# Patient Record
Sex: MALE | Race: WHITE | NOT HISPANIC OR LATINO | URBAN - METROPOLITAN AREA
[De-identification: names, ages, dates, MRNs, and addresses within clinical notes are randomized per-mention and may not be internally consistent; named-entity substitution may affect disease eponyms.]

---

## 2020-03-05 ENCOUNTER — HISTORICAL (OUTPATIENT)
Dept: LAB | Facility: HOSPITAL | Age: 55
End: 2020-03-05

## 2020-03-05 LAB
ABS NEUT (OLG): 4.93 X10(3)/MCL (ref 2.1–9.2)
ANTINUCLEAR ANTIBODY SCREEN (OHS): NEGATIVE
BASOPHILS # BLD AUTO: 0 X10(3)/MCL (ref 0–0.2)
BASOPHILS NFR BLD AUTO: 1 %
BUN SERPL-MCNC: 8 MG/DL (ref 7–18)
CALCIUM SERPL-MCNC: 9 MG/DL (ref 8.5–10.1)
CHLORIDE SERPL-SCNC: 108 MMOL/L (ref 98–107)
CO2 SERPL-SCNC: 29 MMOL/L (ref 21–32)
CREAT SERPL-MCNC: 0.77 MG/DL (ref 0.7–1.3)
CREAT/UREA NIT SERPL: 10.4
DEPRECATED CALCIDIOL+CALCIFEROL SERPL-MC: 5.32 NG/ML (ref 30–80)
DSDNA ANTIBODY (OHS): NEGATIVE
EOSINOPHIL # BLD AUTO: 0.2 X10(3)/MCL (ref 0–0.9)
EOSINOPHIL NFR BLD AUTO: 2 %
ERYTHROCYTE [DISTWIDTH] IN BLOOD BY AUTOMATED COUNT: 13.2 % (ref 11.5–17)
GLUCOSE SERPL-MCNC: 90 MG/DL (ref 74–106)
HCT VFR BLD AUTO: 49.3 % (ref 42–52)
HGB BLD-MCNC: 15.9 GM/DL (ref 14–18)
LYMPHOCYTES # BLD AUTO: 2.7 X10(3)/MCL (ref 0.6–4.6)
LYMPHOCYTES NFR BLD AUTO: 32 %
MCH RBC QN AUTO: 31.1 PG (ref 27–31)
MCHC RBC AUTO-ENTMCNC: 32.3 GM/DL (ref 33–36)
MCV RBC AUTO: 96.3 FL (ref 80–94)
MONOCYTES # BLD AUTO: 0.6 X10(3)/MCL (ref 0.1–1.3)
MONOCYTES NFR BLD AUTO: 7 %
NEUTROPHILS # BLD AUTO: 4.93 X10(3)/MCL (ref 2.1–9.2)
NEUTROPHILS NFR BLD AUTO: 59 %
PLATELET # BLD AUTO: 289 X10(3)/MCL (ref 130–400)
PMV BLD AUTO: 9.4 FL (ref 9.4–12.4)
POTASSIUM SERPL-SCNC: 4.7 MMOL/L (ref 3.5–5.1)
PSA SERPL-MCNC: 1.98 NG/ML (ref 0–4)
RBC # BLD AUTO: 5.12 X10(6)/MCL (ref 4.7–6.1)
SODIUM SERPL-SCNC: 140 MMOL/L (ref 136–145)
TSH SERPL-ACNC: 1.85 MIU/L (ref 0.36–3.74)
VIT B12 SERPL-MCNC: 151 PG/ML (ref 193–986)
WBC # SPEC AUTO: 8.4 X10(3)/MCL (ref 4.5–11.5)

## 2020-03-17 ENCOUNTER — HISTORICAL (OUTPATIENT)
Dept: RESPIRATORY THERAPY | Facility: HOSPITAL | Age: 55
End: 2020-03-17

## 2020-10-07 ENCOUNTER — HISTORICAL (OUTPATIENT)
Dept: ADMINISTRATIVE | Facility: HOSPITAL | Age: 55
End: 2020-10-07

## 2020-10-07 LAB
ABS NEUT (OLG): 4.1 X10(3)/MCL (ref 2.1–9.2)
ALBUMIN SERPL-MCNC: 4.3 GM/DL (ref 3.5–5)
ALBUMIN/GLOB SERPL: 2 RATIO (ref 1.1–2)
ALP SERPL-CCNC: 84 UNIT/L (ref 40–150)
ALT SERPL-CCNC: 11 UNIT/L (ref 0–55)
AST SERPL-CCNC: 15 UNIT/L (ref 5–34)
BASOPHILS # BLD AUTO: 0 X10(3)/MCL (ref 0–0.2)
BASOPHILS NFR BLD AUTO: 0 %
BILIRUB SERPL-MCNC: 1.2 MG/DL
BILIRUBIN DIRECT+TOT PNL SERPL-MCNC: 0.4 MG/DL (ref 0–0.5)
BILIRUBIN DIRECT+TOT PNL SERPL-MCNC: 0.8 MG/DL (ref 0–0.8)
BUN SERPL-MCNC: 9 MG/DL (ref 8.4–25.7)
CALCIUM SERPL-MCNC: 9 MG/DL (ref 8.4–10.2)
CHLORIDE SERPL-SCNC: 106 MMOL/L (ref 98–107)
CHOLEST SERPL-MCNC: 167 MG/DL
CHOLEST/HDLC SERPL: 4 {RATIO} (ref 0–5)
CO2 SERPL-SCNC: 28 MMOL/L (ref 22–29)
CREAT SERPL-MCNC: 0.77 MG/DL (ref 0.73–1.18)
DEPRECATED CALCIDIOL+CALCIFEROL SERPL-MC: 23.5 NG/ML (ref 6.6–49.9)
EOSINOPHIL # BLD AUTO: 0.2 X10(3)/MCL (ref 0–0.9)
EOSINOPHIL NFR BLD AUTO: 3 %
ERYTHROCYTE [DISTWIDTH] IN BLOOD BY AUTOMATED COUNT: 13.2 % (ref 11.5–17)
GLOBULIN SER-MCNC: 2.1 GM/DL (ref 2.4–3.5)
GLUCOSE SERPL-MCNC: 83 MG/DL (ref 74–100)
HCT VFR BLD AUTO: 45 % (ref 42–52)
HDLC SERPL-MCNC: 46 MG/DL (ref 35–60)
HGB BLD-MCNC: 14.8 GM/DL (ref 14–18)
LDLC SERPL CALC-MCNC: 107 MG/DL (ref 50–140)
LYMPHOCYTES # BLD AUTO: 3.1 X10(3)/MCL (ref 0.6–4.6)
LYMPHOCYTES NFR BLD AUTO: 39 %
MCH RBC QN AUTO: 31.2 PG (ref 27–31)
MCHC RBC AUTO-ENTMCNC: 32.9 GM/DL (ref 33–36)
MCV RBC AUTO: 94.7 FL (ref 80–94)
MONOCYTES # BLD AUTO: 0.6 X10(3)/MCL (ref 0.1–1.3)
MONOCYTES NFR BLD AUTO: 7 %
NEUTROPHILS # BLD AUTO: 4.1 X10(3)/MCL (ref 2.1–9.2)
NEUTROPHILS NFR BLD AUTO: 51 %
PLATELET # BLD AUTO: 261 X10(3)/MCL (ref 130–400)
PMV BLD AUTO: 8.7 FL (ref 9.4–12.4)
POTASSIUM SERPL-SCNC: 4.7 MMOL/L (ref 3.5–5.1)
PROT SERPL-MCNC: 6.4 GM/DL (ref 6.4–8.3)
PSA SERPL-MCNC: 1.74 NG/ML
RBC # BLD AUTO: 4.75 X10(6)/MCL (ref 4.7–6.1)
SODIUM SERPL-SCNC: 140 MMOL/L (ref 136–145)
TRIGL SERPL-MCNC: 69 MG/DL (ref 34–140)
TSH SERPL-ACNC: 1.37 UIU/ML (ref 0.35–4.94)
VIT B12 SERPL-MCNC: 513 PG/ML (ref 213–816)
VLDLC SERPL CALC-MCNC: 14 MG/DL
WBC # SPEC AUTO: 8.1 X10(3)/MCL (ref 4.5–11.5)

## 2021-07-20 ENCOUNTER — HISTORICAL (OUTPATIENT)
Dept: ADMINISTRATIVE | Facility: HOSPITAL | Age: 56
End: 2021-07-20

## 2021-07-20 LAB
ABS NEUT (OLG): 3.83 X10(3)/MCL (ref 2.1–9.2)
ALBUMIN SERPL-MCNC: 4 GM/DL (ref 3.5–5)
ALBUMIN/GLOB SERPL: 2 RATIO (ref 1.1–2)
ALP SERPL-CCNC: 86 UNIT/L (ref 40–150)
ALT SERPL-CCNC: 16 UNIT/L (ref 0–55)
AST SERPL-CCNC: 17 UNIT/L (ref 5–34)
BASOPHILS # BLD AUTO: 0 X10(3)/MCL (ref 0–0.2)
BASOPHILS NFR BLD AUTO: 1 %
BILIRUB SERPL-MCNC: 1 MG/DL
BILIRUBIN DIRECT+TOT PNL SERPL-MCNC: 0.4 MG/DL (ref 0–0.5)
BILIRUBIN DIRECT+TOT PNL SERPL-MCNC: 0.6 MG/DL (ref 0–0.8)
BUN SERPL-MCNC: 8.5 MG/DL (ref 8.4–25.7)
CALCIUM SERPL-MCNC: 9.4 MG/DL (ref 8.4–10.2)
CHLORIDE SERPL-SCNC: 108 MMOL/L (ref 98–107)
CHOLEST SERPL-MCNC: 139 MG/DL
CHOLEST/HDLC SERPL: 4 {RATIO} (ref 0–5)
CO2 SERPL-SCNC: 26 MMOL/L (ref 22–29)
CREAT SERPL-MCNC: 0.78 MG/DL (ref 0.73–1.18)
DEPRECATED CALCIDIOL+CALCIFEROL SERPL-MC: 25.7 NG/ML (ref 30–80)
EOSINOPHIL # BLD AUTO: 0.2 X10(3)/MCL (ref 0–0.9)
EOSINOPHIL NFR BLD AUTO: 2 %
ERYTHROCYTE [DISTWIDTH] IN BLOOD BY AUTOMATED COUNT: 13.4 % (ref 11.5–17)
GLOBULIN SER-MCNC: 2 GM/DL (ref 2.4–3.5)
GLUCOSE SERPL-MCNC: 87 MG/DL (ref 74–100)
HCT VFR BLD AUTO: 44.5 % (ref 42–52)
HDLC SERPL-MCNC: 36 MG/DL (ref 35–60)
HGB BLD-MCNC: 14.6 GM/DL (ref 14–18)
LDLC SERPL CALC-MCNC: 91 MG/DL (ref 50–140)
LYMPHOCYTES # BLD AUTO: 3 X10(3)/MCL (ref 0.6–4.6)
LYMPHOCYTES NFR BLD AUTO: 39 %
MCH RBC QN AUTO: 30.8 PG (ref 27–31)
MCHC RBC AUTO-ENTMCNC: 32.8 GM/DL (ref 33–36)
MCV RBC AUTO: 93.9 FL (ref 80–94)
MONOCYTES # BLD AUTO: 0.6 X10(3)/MCL (ref 0.1–1.3)
MONOCYTES NFR BLD AUTO: 8 %
NEUTROPHILS # BLD AUTO: 3.83 X10(3)/MCL (ref 2.1–9.2)
NEUTROPHILS NFR BLD AUTO: 50 %
PLATELET # BLD AUTO: 297 X10(3)/MCL (ref 130–400)
PMV BLD AUTO: 8.9 FL (ref 9.4–12.4)
POTASSIUM SERPL-SCNC: 5.4 MMOL/L (ref 3.5–5.1)
PROT SERPL-MCNC: 6 GM/DL (ref 6.4–8.3)
PSA SERPL-MCNC: 1.49 NG/ML
RBC # BLD AUTO: 4.74 X10(6)/MCL (ref 4.7–6.1)
SODIUM SERPL-SCNC: 144 MMOL/L (ref 136–145)
TRIGL SERPL-MCNC: 58 MG/DL (ref 34–140)
TSH SERPL-ACNC: 1.41 UIU/ML (ref 0.35–4.94)
VIT B12 SERPL-MCNC: 511 PG/ML (ref 213–816)
VLDLC SERPL CALC-MCNC: 12 MG/DL
WBC # SPEC AUTO: 7.7 X10(3)/MCL (ref 4.5–11.5)

## 2021-08-04 ENCOUNTER — HISTORICAL (OUTPATIENT)
Dept: RADIOLOGY | Facility: HOSPITAL | Age: 56
End: 2021-08-04

## 2023-06-06 DIAGNOSIS — J30.9 ALLERGIC RHINITIS, UNSPECIFIED: ICD-10-CM

## 2023-06-06 RX ORDER — FLUTICASONE PROPIONATE 50 MCG
SPRAY, SUSPENSION (ML) NASAL
Qty: 16 G | Refills: 3 | Status: SHIPPED | OUTPATIENT
Start: 2023-06-06 | End: 2024-03-21 | Stop reason: SDUPTHER

## 2023-11-14 ENCOUNTER — OFFICE VISIT (OUTPATIENT)
Dept: URGENT CARE | Facility: CLINIC | Age: 58
End: 2023-11-14
Payer: COMMERCIAL

## 2023-11-14 VITALS
HEIGHT: 68 IN | OXYGEN SATURATION: 98 % | DIASTOLIC BLOOD PRESSURE: 97 MMHG | HEART RATE: 87 BPM | SYSTOLIC BLOOD PRESSURE: 169 MMHG | BODY MASS INDEX: 21.52 KG/M2 | TEMPERATURE: 98 F | RESPIRATION RATE: 18 BRPM | WEIGHT: 142 LBS

## 2023-11-14 DIAGNOSIS — N50.819 PAIN IN TESTICLE, UNSPECIFIED LATERALITY: Primary | ICD-10-CM

## 2023-11-14 PROCEDURE — 99213 OFFICE O/P EST LOW 20 MIN: CPT | Mod: ,,, | Performed by: FAMILY MEDICINE

## 2023-11-14 PROCEDURE — 99213 PR OFFICE/OUTPT VISIT, EST, LEVL III, 20-29 MIN: ICD-10-PCS | Mod: ,,, | Performed by: FAMILY MEDICINE

## 2023-11-14 NOTE — PROGRESS NOTES
"Subjective:      Patient ID: Ahsan Loredo Jr is a 58 y.o. male.    Vitals:  height is 5' 8" (1.727 m) and weight is 64.4 kg (142 lb). His temperature is 98.3 °F (36.8 °C). His blood pressure is 169/97 (abnormal) and his pulse is 87. His respiration is 18 and oxygen saturation is 98%.     Chief Complaint: Groin Pain (Groin pain left testicle x3w painful to touch /Denies any trama/Request Dr. Abel)    58-year-old male presents to clinic complaining of a 3 to four-week history of testicular pain and swelling.  States it began on the right side with pain and swelling.  Patient states the swelling resolved but the pain in the testicle never resolved.  Now he is having pain in the left testicle.  Worse when he sits down.  No issues with voids.        Constitution: Negative.   HENT: Negative.     Neck: neck negative.   Cardiovascular: Negative.    Eyes: Negative.    Respiratory: Negative.     Gastrointestinal: Negative.    Genitourinary:  Positive for testicular pain.   Musculoskeletal: Negative.    Skin: Negative.    Allergic/Immunologic: Negative.    Neurological: Negative.    Hematologic/Lymphatic: Negative.       Objective:     Physical Exam   Constitutional:  Non-toxic appearance. He does not appear ill. No distress.   HENT:   Head: Normocephalic and atraumatic.   Abdominal: Normal appearance.   Genitourinary: Right testis shows tenderness. Right testis shows no swelling. Left testis shows tenderness. Left testis shows no swelling.   Neurological: He is alert.   Skin: Skin is not diaphoretic.   Vitals reviewed.         Previous History      Review of patient's allergies indicates:  No Known Allergies    Past Medical History:   Diagnosis Date    Asthma     COPD (chronic obstructive pulmonary disease)     Crohn's disease      Current Outpatient Medications   Medication Instructions    albuterol (PROVENTIL/VENTOLIN HFA) 90 mcg/actuation inhaler inhale 1 PUFF BY MOUTH FOUR TIMES DAILY AS NEEDED FOR wheezing    ALLERGY " "RELIEF, FEXOFENADINE, 180 mg tablet TAKE ONE TABLET BY MOUTH DAILY    fluticasone propionate (FLONASE) 50 mcg/actuation nasal spray inhale 2 SPRAYS IN EACH NOSTRIL TWICE DAILY    montelukast (SINGULAIR) 10 mg tablet TAKE ONE TABLET BY MOUTH DAILY     Past Surgical History:   Procedure Laterality Date    HEMORRHOID SURGERY       Family History   Problem Relation Age of Onset    Thyroid cancer Mother     Cancer Father        Social History     Tobacco Use    Smoking status: Every Day     Current packs/day: 0.50     Types: Cigarettes    Smokeless tobacco: Never   Substance Use Topics    Alcohol use: Never        Physical Exam      Vital Signs Reviewed   BP (!) 169/97   Pulse 87   Temp 98.3 °F (36.8 °C)   Resp 18   Ht 5' 8" (1.727 m)   Wt 64.4 kg (142 lb)   SpO2 98%   BMI 21.59 kg/m²        Procedures    Procedures     Labs     Results for orders placed or performed in visit on 07/20/21   Comprehensive Metabolic Panel   Result Value Ref Range    Alanine Aminotransferase 16 0 - 55 unit/L    Albumin/Globulin Ratio 2.0 1.1 - 2.0 ratio    Alkaline Phosphatase 86 40 - 150 unit/L    Aspartate Aminotransferase 17 5 - 34 unit/L    Albumin Level 4.0 3.5 - 5.0 gm/dL    Blood Urea Nitrogen 8.5 8.4 - 25.7 mg/dL    Bilirubin Total 1.0 <<=1.5 mg/dL    Bilirubin Indirect 0.60 0.00 - 0.80 mg/dL    Bilirubin Direct 0.4 0.0 - 0.5 mg/dL    Carbon Dioxide 26 22 - 29 mmol/L    Calcium Level Total 9.4 8.4 - 10.2 mg/dL    Chloride 108 (H) 98 - 107 mmol/L    Creatinine 0.78 0.73 - 1.18 mg/dL    Glucose Level 87 74 - 100 mg/dL    Globulin 2.0 (L) 2.4 - 3.5 gm/dL    Sodium Level 144 136 - 145 mmol/L    Potassium Level 5.4 (H) 3.5 - 5.1 mmol/L    Protein Total 6.0 (L) 6.4 - 8.3 gm/dL   Lipid Panel   Result Value Ref Range    Cholesterol/HDL Ratio 4 0 - 5    Cholesterol Total 139 <<=200 mg/dL    HDL Cholesterol 36 35 - 60 mg/dL    LDL Cholesterol 91.00 50.00 - 140.00 mg/dL    Triglyceride 58 34 - 140 mg/dL    Very Low Density Lipoprotein 12 "    PSA, Screening   Result Value Ref Range    Prostate Specific Antigen 1.49 <<=4.00 ng/mL   TSH   Result Value Ref Range    TSH 1.4058 0.3500 - 4.9400 uIU/mL   Vitamin B12   Result Value Ref Range    Vitamin B12 Level 511 213 - 816 pg/mL   Vitamin D   Result Value Ref Range    Vit D 25 OH 25.7 (L) 30.0 - 80.0 ng/mL   GFR, Estimated   Result Value Ref Range    Estimated GFR-Non African American >60 mL/min/1.73 m2    Estimated GFR- >60    Differential   Result Value Ref Range    Baso # 0.0 0.0 - 0.2 x10(3)/mcL    Neut # 3.83 2.10 - 9.20 x10(3)/mcL    Mono # 0.6 0.1 - 1.3 x10(3)/mcL    Basophil % 1 %    Eos # 0.2 0.0 - 0.9 x10(3)/mcL    Lymph # 3.0 0.6 - 4.6 x10(3)/mcL    Mono % 8 %    Lymph % 39 %    Eos % 2 %    Neut % 50 %   CBC Auto Differential   Result Value Ref Range    Neutrophils Abs 3.83 2.10 - 9.20 x10(3)/mcL    Hgb 14.6 14.0 - 18.0 gm/dL    MCH 30.8 27.0 - 31.0 pg    Hct 44.5 42.0 - 52.0 %    MCHC 32.8 (L) 33.0 - 36.0 gm/dL    MCV 93.9 80.0 - 94.0 fL    MPV 8.9 (L) 9.4 - 12.4 fL    RDW 13.4 11.5 - 17.0 %    WBC 7.7 4.5 - 11.5 x10(3)/mcL    Platelet 297 130 - 400 x10(3)/mcL    RBC 4.74 4.70 - 6.10 x10(6)/mcL       Assessment:     1. Pain in testicle, unspecified laterality        Plan:   As we discussed, it is recommended that you present to the ER now for further evaluation to prevent a delay in care.     Been referred to Dr. Bloom for your PCP needs.    Pain in testicle, unspecified laterality  -     Ambulatory referral/consult to Family Practice

## 2023-12-20 ENCOUNTER — OFFICE VISIT (OUTPATIENT)
Dept: PRIMARY CARE CLINIC | Facility: CLINIC | Age: 58
End: 2023-12-20
Payer: COMMERCIAL

## 2023-12-20 VITALS
DIASTOLIC BLOOD PRESSURE: 88 MMHG | RESPIRATION RATE: 18 BRPM | BODY MASS INDEX: 21.98 KG/M2 | OXYGEN SATURATION: 99 % | SYSTOLIC BLOOD PRESSURE: 137 MMHG | HEIGHT: 68 IN | WEIGHT: 145 LBS | HEART RATE: 90 BPM

## 2023-12-20 DIAGNOSIS — Z82.49 FAMILY HISTORY OF EARLY CAD: Chronic | ICD-10-CM

## 2023-12-20 DIAGNOSIS — Z76.89 ESTABLISHING CARE WITH NEW DOCTOR, ENCOUNTER FOR: Primary | ICD-10-CM

## 2023-12-20 DIAGNOSIS — Z00.00 WELLNESS EXAMINATION: ICD-10-CM

## 2023-12-20 DIAGNOSIS — F17.210 SMOKING GREATER THAN 40 PACK YEARS: Chronic | ICD-10-CM

## 2023-12-20 DIAGNOSIS — K50.10 CROHN'S DISEASE OF LARGE INTESTINE WITHOUT COMPLICATION: Chronic | ICD-10-CM

## 2023-12-20 DIAGNOSIS — Z12.5 SCREENING FOR PROSTATE CANCER: ICD-10-CM

## 2023-12-20 DIAGNOSIS — J43.2 CENTRILOBULAR EMPHYSEMA: Chronic | ICD-10-CM

## 2023-12-20 PROCEDURE — 99213 PR OFFICE/OUTPT VISIT, EST, LEVL III, 20-29 MIN: ICD-10-PCS | Mod: ,,, | Performed by: GENERAL PRACTICE

## 2023-12-20 PROCEDURE — 99213 OFFICE O/P EST LOW 20 MIN: CPT | Mod: ,,, | Performed by: GENERAL PRACTICE

## 2023-12-20 NOTE — ASSESSMENT & PLAN NOTE
Smoking cessation strongly encouraged, we will evaluate over time and possibly modify his medical regimen.

## 2023-12-20 NOTE — PROGRESS NOTES
"Subjective:       Patient ID: Ahsan Loredo Jr is a 58 y.o. male.    Chief Complaint: Establish Care    Patient presents to the clinic to establish primary care.  Past medical, family, and social history is reviewed, as well as all chronic conditions, and medications prescribed to treat those conditions.  -history of COPD, saw a pulmonologist previously, had pulmonary function testing, prescribed an inhaler which she could not afford, currently using albuterol on occasion, some dyspnea on exertion, still smoking, over 50 pack year, quit for a while, now smoking about five cigarettes per day.  -history of a modified form of Crohn's disease, in remission, not on medication, does not actively see a gastroenterologist          Review of Systems   Constitutional: Negative.  Negative for fatigue and fever.   HENT: Negative.  Negative for sore throat and trouble swallowing.    Eyes: Negative.  Negative for redness and visual disturbance.   Respiratory: Negative.  Negative for chest tightness and shortness of breath.    Cardiovascular: Negative.  Negative for chest pain.   Gastrointestinal: Negative.  Negative for abdominal pain, blood in stool and nausea.   Endocrine: Negative.  Negative for cold intolerance, heat intolerance and polyuria.   Genitourinary: Negative.    Musculoskeletal: Negative.  Negative for arthralgias, gait problem and joint swelling.   Integumentary:  Negative for rash. Negative.   Neurological: Negative.  Negative for dizziness, weakness and headaches.   Psychiatric/Behavioral: Negative.  Negative for agitation and dysphoric mood.            Patient Care Team:  Duglas Bloom MD as PCP - General (Family Medicine)  Duglas Bloom MD as Consulting Physician (Family Medicine)  Objective:   Visit Vitals  /88   Pulse 90   Resp 18   Ht 5' 8" (1.727 m)   Wt 65.8 kg (145 lb)   SpO2 99%   BMI 22.05 kg/m²        Physical Exam  Constitutional:       Appearance: Normal appearance.   HENT:      Head: " "Normocephalic.      Mouth/Throat:      Mouth: Mucous membranes are moist.      Pharynx: Oropharynx is clear.   Eyes:      Conjunctiva/sclera: Conjunctivae normal.      Pupils: Pupils are equal, round, and reactive to light.   Cardiovascular:      Rate and Rhythm: Normal rate and regular rhythm.      Heart sounds: Normal heart sounds.   Pulmonary:      Effort: Pulmonary effort is normal.      Comments: Bilateral decrease in breath sounds consistent with emphysema  Abdominal:      General: Abdomen is flat.      Palpations: Abdomen is soft.   Musculoskeletal:         General: Normal range of motion.      Cervical back: Neck supple.   Skin:     General: Skin is warm and dry.   Neurological:      General: No focal deficit present.      Mental Status: He is alert and oriented to person, place, and time.   Psychiatric:         Mood and Affect: Mood normal.         Behavior: Behavior normal.         Thought Content: Thought content normal.         Judgment: Judgment normal.           Lab Results   Component Value Date     07/20/2021    K 5.4 (H) 07/20/2021    CO2 26 07/20/2021    BUN 8.5 07/20/2021    CREATININE 0.78 07/20/2021    CALCIUM 9.4 07/20/2021    ALBUMIN 4.0 07/20/2021    BILITOT 1.0 07/20/2021    ALKPHOS 86 07/20/2021    AST 17 07/20/2021    ALT 16 07/20/2021     Lab Results   Component Value Date    WBC 7.7 07/20/2021    RBC 4.74 07/20/2021    HGB 14.6 07/20/2021    HCT 44.5 07/20/2021    MCV 93.9 07/20/2021    RDW 13.4 07/20/2021     07/20/2021      Lab Results   Component Value Date    CHOL 139 07/20/2021    TRIG 58 07/20/2021    HDL 36 07/20/2021    LDL 91.00 07/20/2021    TOTALCHOLEST 4 07/20/2021     Lab Results   Component Value Date    TSH 1.4058 07/20/2021     No results found for: "HGBA1C", "ESTIMATEDAVG"     Lab Results   Component Value Date    PSA 1.49 07/20/2021         Assessment:       ICD-10-CM ICD-9-CM   1. Establishing care with new doctor, encounter for  Z76.89 V65.8   2. Crohn's " disease of large intestine without complication  K50.10 555.1   3. Centrilobular emphysema  J43.2 492.8   4. Smoking greater than 40 pack years  F17.210 305.1   5. Family history of early CAD  Z82.49 V17.3   6. Wellness examination  Z00.00 V70.0   7. Screening for prostate cancer  Z12.5 V76.44       Current Outpatient Medications   Medication Instructions    albuterol (PROVENTIL/VENTOLIN HFA) 90 mcg/actuation inhaler inhale 1 PUFF BY MOUTH FOUR TIMES DAILY AS NEEDED FOR wheezing    ALLERGY RELIEF, FEXOFENADINE, 180 mg tablet TAKE ONE TABLET BY MOUTH DAILY    fluticasone propionate (FLONASE) 50 mcg/actuation nasal spray inhale 2 SPRAYS IN EACH NOSTRIL TWICE DAILY    montelukast (SINGULAIR) 10 mg tablet TAKE ONE TABLET BY MOUTH DAILY     Plan:     Problem List Items Addressed This Visit          Pulmonary    Centrilobular emphysema (Chronic)    Overview     Prescribed albuterol inhaler, uses as needed.         Current Assessment & Plan     Smoking cessation strongly encouraged, we will evaluate over time and possibly modify his medical regimen.            Cardiac/Vascular    Family history of early CAD (Chronic)    Overview     We will continue to monitor all modifiable risk factors to reduce overall risk of cardiovascular disease.            GI    Crohn's disease of large intestine without complication (Chronic)    Overview     Medical condition is currently stable, continue current treatment plan.            Other    Smoking greater than 40 pack years (Chronic)    Overview     Cessation of tobacco use in any form was discussed and strongly encouraged.  Assistance offered, information will be provided.  If not ready to quit now, patient will contact this clinic in the future if I can be of any assistance related to the discontinuation of tobacco use/smoking.          Other Visit Diagnoses       Establishing care with new doctor, encounter for    -  Primary    Patient's medical care has been established in this  clinic.  All issues addressed, all questions answered,  with appropriate scheduling of follow-up care.    Wellness examination        This diagnosis does not apply to this visit, wellness exam with pre visit labs will be scheduled/ordered for future visit.    Relevant Orders    Comprehensive Metabolic Panel    CBC Auto Differential    Lipid Panel    Hemoglobin A1C    TSH    PSA, Screening    Hepatitis C Antibody    Screening for prostate cancer        This diagnosis does not apply to this visit, appropriate prostate cancer screening will be ordered for next visit/wellness exam.    Relevant Orders    PSA, Screening                    Follow up in about 3 months (around 3/26/2024) for Wellness.

## 2024-02-05 ENCOUNTER — TELEPHONE (OUTPATIENT)
Dept: PRIMARY CARE CLINIC | Facility: CLINIC | Age: 59
End: 2024-02-05
Payer: COMMERCIAL

## 2024-03-21 ENCOUNTER — CLINICAL SUPPORT (OUTPATIENT)
Dept: PRIMARY CARE CLINIC | Facility: CLINIC | Age: 59
End: 2024-03-21
Payer: COMMERCIAL

## 2024-03-21 DIAGNOSIS — Z00.00 WELLNESS EXAMINATION: ICD-10-CM

## 2024-03-21 DIAGNOSIS — Z12.5 SCREENING FOR PROSTATE CANCER: ICD-10-CM

## 2024-03-21 DIAGNOSIS — J44.9 CHRONIC OBSTRUCTIVE PULMONARY DISEASE, UNSPECIFIED: ICD-10-CM

## 2024-03-21 DIAGNOSIS — J30.9 ALLERGIC RHINITIS, UNSPECIFIED: ICD-10-CM

## 2024-03-21 LAB
ALBUMIN SERPL-MCNC: 4.3 G/DL (ref 3.5–5)
ALBUMIN/GLOB SERPL: 2 RATIO (ref 1.1–2)
ALP SERPL-CCNC: 95 UNIT/L (ref 40–150)
ALT SERPL-CCNC: 12 UNIT/L (ref 0–55)
AST SERPL-CCNC: 18 UNIT/L (ref 5–34)
BASOPHILS # BLD AUTO: 0.04 X10(3)/MCL
BASOPHILS NFR BLD AUTO: 0.6 %
BILIRUB SERPL-MCNC: 1.5 MG/DL
BUN SERPL-MCNC: 10 MG/DL (ref 8.4–25.7)
CALCIUM SERPL-MCNC: 9.7 MG/DL (ref 8.4–10.2)
CHLORIDE SERPL-SCNC: 106 MMOL/L (ref 98–107)
CHOLEST SERPL-MCNC: 180 MG/DL
CHOLEST/HDLC SERPL: 3 {RATIO} (ref 0–5)
CO2 SERPL-SCNC: 29 MMOL/L (ref 22–29)
CREAT SERPL-MCNC: 0.88 MG/DL (ref 0.73–1.18)
EOSINOPHIL # BLD AUTO: 0.16 X10(3)/MCL (ref 0–0.9)
EOSINOPHIL NFR BLD AUTO: 2.3 %
ERYTHROCYTE [DISTWIDTH] IN BLOOD BY AUTOMATED COUNT: 13.2 % (ref 11.5–17)
EST. AVERAGE GLUCOSE BLD GHB EST-MCNC: 102.5 MG/DL
GFR SERPLBLD CREATININE-BSD FMLA CKD-EPI: >60 MLS/MIN/1.73/M2
GLOBULIN SER-MCNC: 2.2 GM/DL (ref 2.4–3.5)
GLUCOSE SERPL-MCNC: 91 MG/DL (ref 74–100)
HBA1C MFR BLD: 5.2 %
HCT VFR BLD AUTO: 46.8 % (ref 42–52)
HCV AB SERPL QL IA: NONREACTIVE
HDLC SERPL-MCNC: 56 MG/DL (ref 35–60)
HGB BLD-MCNC: 15.1 G/DL (ref 14–18)
IMM GRANULOCYTES # BLD AUTO: 0.02 X10(3)/MCL (ref 0–0.04)
IMM GRANULOCYTES NFR BLD AUTO: 0.3 %
LDLC SERPL CALC-MCNC: 105 MG/DL (ref 50–140)
LYMPHOCYTES # BLD AUTO: 2.98 X10(3)/MCL (ref 0.6–4.6)
LYMPHOCYTES NFR BLD AUTO: 43.2 %
MCH RBC QN AUTO: 30.4 PG (ref 27–31)
MCHC RBC AUTO-ENTMCNC: 32.3 G/DL (ref 33–36)
MCV RBC AUTO: 94.4 FL (ref 80–94)
MONOCYTES # BLD AUTO: 0.61 X10(3)/MCL (ref 0.1–1.3)
MONOCYTES NFR BLD AUTO: 8.8 %
NEUTROPHILS # BLD AUTO: 3.09 X10(3)/MCL (ref 2.1–9.2)
NEUTROPHILS NFR BLD AUTO: 44.8 %
NRBC BLD AUTO-RTO: 0 %
PLATELET # BLD AUTO: 295 X10(3)/MCL (ref 130–400)
PMV BLD AUTO: 9 FL (ref 7.4–10.4)
POTASSIUM SERPL-SCNC: 4.6 MMOL/L (ref 3.5–5.1)
PROT SERPL-MCNC: 6.5 GM/DL (ref 6.4–8.3)
PSA SERPL-MCNC: 1.62 NG/ML
RBC # BLD AUTO: 4.96 X10(6)/MCL (ref 4.7–6.1)
SODIUM SERPL-SCNC: 143 MMOL/L (ref 136–145)
TRIGL SERPL-MCNC: 93 MG/DL (ref 34–140)
TSH SERPL-ACNC: 1.91 UIU/ML (ref 0.35–4.94)
VLDLC SERPL CALC-MCNC: 19 MG/DL
WBC # SPEC AUTO: 6.9 X10(3)/MCL (ref 4.5–11.5)

## 2024-03-21 PROCEDURE — 80053 COMPREHEN METABOLIC PANEL: CPT | Performed by: GENERAL PRACTICE

## 2024-03-21 PROCEDURE — 84443 ASSAY THYROID STIM HORMONE: CPT | Performed by: GENERAL PRACTICE

## 2024-03-21 PROCEDURE — 86803 HEPATITIS C AB TEST: CPT | Performed by: GENERAL PRACTICE

## 2024-03-21 PROCEDURE — 36415 COLL VENOUS BLD VENIPUNCTURE: CPT

## 2024-03-21 PROCEDURE — 84153 ASSAY OF PSA TOTAL: CPT | Performed by: GENERAL PRACTICE

## 2024-03-21 PROCEDURE — 80061 LIPID PANEL: CPT | Performed by: GENERAL PRACTICE

## 2024-03-21 PROCEDURE — 85025 COMPLETE CBC W/AUTO DIFF WBC: CPT | Performed by: GENERAL PRACTICE

## 2024-03-21 PROCEDURE — 83036 HEMOGLOBIN GLYCOSYLATED A1C: CPT | Performed by: GENERAL PRACTICE

## 2024-03-21 PROCEDURE — 36415 COLL VENOUS BLD VENIPUNCTURE: CPT | Mod: ,,, | Performed by: GENERAL PRACTICE

## 2024-03-21 RX ORDER — MONTELUKAST SODIUM 10 MG/1
10 TABLET ORAL DAILY
Qty: 90 TABLET | Refills: 3 | Status: SHIPPED | OUTPATIENT
Start: 2024-03-21

## 2024-03-21 RX ORDER — MINERAL OIL
180 ENEMA (ML) RECTAL DAILY
Qty: 90 TABLET | Refills: 3 | Status: SHIPPED | OUTPATIENT
Start: 2024-03-21

## 2024-03-21 RX ORDER — FLUTICASONE PROPIONATE 50 MCG
2 SPRAY, SUSPENSION (ML) NASAL 2 TIMES DAILY
Qty: 16 G | Refills: 3 | Status: SHIPPED | OUTPATIENT
Start: 2024-03-21

## 2024-03-21 NOTE — PROGRESS NOTES
Patient verified name and .Patient here for nurse visit to draw AW labs with (22) gauge needle. Patient was drawn in left antecubital .No complications or issues occurred. Patient tolerated venipuncture well. Patient expressed no questions or concerns.

## 2024-04-03 NOTE — PROGRESS NOTES
Subjective:       Patient ID: Ahsan Loredo Jr is a 58 y.o. male.    Chief Complaint: Annual Exam (Pt states he has been doing well has concerns with SOB due to allergies )    Patient presents to the clinic today for wellness examination.  Current and past medical history reviewed  Pertinent family and social history reviewed    Colorectal cancer screening:  CRC screening reviewed  Prostate CA screening:  Prostate CA screening reviewed  Vaccinations due:  All vaccinations due and/or desired have been addressed and given.    He does have a few issues that he has had recently.  He does have some dyspnea, seems to be associated with allergies, particularly worse during heavy pollen times, causing some shortness of breath.  Notably, he has stopped smoking, still having cravings but does not plan to resume smoking cigarettes.    Also, he does have a family history of early CAD, and he has had some occasional episodes of midsternal precordial chest pain.  No pain today.  Has never seen a cardiologist.          Review of Systems   Constitutional: Negative.  Negative for fatigue and fever.   HENT: Negative.  Negative for sore throat and trouble swallowing.    Eyes: Negative.  Negative for redness and visual disturbance.   Respiratory:  Positive for shortness of breath. Negative for chest tightness.    Cardiovascular:  Positive for chest pain.   Gastrointestinal: Negative.  Negative for abdominal pain, blood in stool and nausea.   Endocrine: Negative.  Negative for cold intolerance, heat intolerance and polyuria.   Genitourinary: Negative.    Musculoskeletal: Negative.  Negative for arthralgias, gait problem and joint swelling.   Integumentary:  Negative for rash. Negative.   Neurological: Negative.  Negative for dizziness, weakness and headaches.   Psychiatric/Behavioral: Negative.  Negative for agitation and dysphoric mood.            Patient Care Team:  Duglas Bloom MD as PCP - General (Family Medicine)  Wolf  MD Duglas as Consulting Physician (Family Medicine)  Objective:   Visit Vitals  BP (!) 156/93 (BP Location: Left arm, Patient Position: Sitting, BP Method: Small (Automatic))   Pulse 70   Temp 98.9 °F (37.2 °C) (Temporal)   Resp 18   Wt 72.5 kg (159 lb 12.8 oz)   SpO2 98%   BMI 24.30 kg/m²        Physical Exam  Constitutional:       Appearance: Normal appearance.   HENT:      Head: Normocephalic.      Mouth/Throat:      Mouth: Mucous membranes are moist.      Pharynx: Oropharynx is clear.   Eyes:      Conjunctiva/sclera: Conjunctivae normal.      Pupils: Pupils are equal, round, and reactive to light.   Cardiovascular:      Rate and Rhythm: Normal rate and regular rhythm.      Heart sounds: Normal heart sounds.   Pulmonary:      Effort: Pulmonary effort is normal.      Comments: Symmetrical decreased breath sounds bilaterally  Abdominal:      General: Abdomen is flat.      Palpations: Abdomen is soft.   Musculoskeletal:         General: Normal range of motion.      Cervical back: Neck supple.   Skin:     General: Skin is warm and dry.   Neurological:      General: No focal deficit present.      Mental Status: He is alert and oriented to person, place, and time.   Psychiatric:         Mood and Affect: Mood normal.         Behavior: Behavior normal.         Thought Content: Thought content normal.         Judgment: Judgment normal.           Lab Results   Component Value Date     03/21/2024    K 4.6 03/21/2024    CO2 29 03/21/2024    BUN 10.0 03/21/2024    CREATININE 0.88 03/21/2024    CALCIUM 9.7 03/21/2024    ALBUMIN 4.3 03/21/2024    BILITOT 1.5 03/21/2024    ALKPHOS 95 03/21/2024    AST 18 03/21/2024    ALT 12 03/21/2024    EGFRNORACEVR >60 03/21/2024     Lab Results   Component Value Date    WBC 6.90 03/21/2024    RBC 4.96 03/21/2024    HGB 15.1 03/21/2024    HCT 46.8 03/21/2024    MCV 94.4 (H) 03/21/2024    RDW 13.2 03/21/2024     03/21/2024      Lab Results   Component Value Date    CHOL 180  03/21/2024    TRIG 93 03/21/2024    HDL 56 03/21/2024    .00 03/21/2024    TOTALCHOLEST 3 03/21/2024     Lab Results   Component Value Date    TSH 1.908 03/21/2024     Lab Results   Component Value Date    HGBA1C 5.2 03/21/2024        Lab Results   Component Value Date    PSA 1.62 03/21/2024         Assessment:       ICD-10-CM ICD-9-CM   1. Wellness examination  Z00.00 V70.0   2. Screening for prostate cancer  Z12.5 V76.44   3. Centrilobular emphysema  J43.2 492.8   4. Mild intermittent extrinsic asthma without complication  J45.20 493.00   5. Precordial pain  R07.2 786.51   6. Elevated blood pressure reading without diagnosis of hypertension  R03.0 796.2   7. History of smoking 25-50 pack years  Z87.891 V15.82       Current Outpatient Medications   Medication Instructions    albuterol (PROVENTIL/VENTOLIN HFA) 90 mcg/actuation inhaler inhale 1 PUFF BY MOUTH FOUR TIMES DAILY AS NEEDED FOR wheezing    fexofenadine (ALLERGY RELIEF (FEXOFENADINE)) 180 mg, Oral, Daily    fluticasone propionate (FLONASE) 100 mcg, Each Nostril, 2 times daily    fluticasone-salmeterol diskus inhaler 250-50 mcg 1 puff, Inhalation, 2 times daily, Controller    montelukast (SINGULAIR) 10 mg, Oral, Daily     Plan:     Problem List Items Addressed This Visit          Pulmonary    Centrilobular emphysema (Chronic)    Overview     Prescribed albuterol inhaler and Advair 250/50.  From CT of chest August 2021:      FINDINGS:     Lung nodules: There are a few scattered punctate bilateral pulmonary  nodules, largest measuring up to 3 mm for example in the right lower  lobe (series 602, image 61).      Lung parenchyma: Mild emphysematous changes.    Prescribed albuterol inhaler, uses as needed.         Relevant Medications    albuterol (PROVENTIL/VENTOLIN HFA) 90 mcg/actuation inhaler    Extrinsic asthma without complication (Chronic)    Overview     Prescribed albuterol MDI and Advair 250/50.         Relevant Medications     fluticasone-salmeterol diskus inhaler 250-50 mcg    albuterol (PROVENTIL/VENTOLIN HFA) 90 mcg/actuation inhaler       Cardiac/Vascular    Elevated blood pressure reading without diagnosis of hypertension (Chronic)    Overview     Check blood pressures at home as directed.  Average blood pressures should be no more than 130/80.  Any persistent elevation above 140/90 would suggest significant hypertension, possibly requiring treatment.         Precordial pain    Overview     Cardiology referral placed for an evaluation, I would like for him to check his blood pressures at home and bring those to his cardiology appointment.         Relevant Orders    Ambulatory referral/consult to Cardiology       Other    History of smoking 25-50 pack years (Chronic)     Other Visit Diagnoses       Wellness examination    -  Primary    Overall health status was reviewed.  Good health habits reinforced.  Annual wellness exams recommended.    Screening for prostate cancer        Prostate specific antigen blood test obtained was essentially normal, suggesting that there is no current concern for prostate cancer.  Digital exam deferred.                    Follow up in about 6 months (around 10/8/2024) for Chronic condition F/up.

## 2024-04-04 ENCOUNTER — OFFICE VISIT (OUTPATIENT)
Dept: PRIMARY CARE CLINIC | Facility: CLINIC | Age: 59
End: 2024-04-04
Payer: COMMERCIAL

## 2024-04-04 VITALS
TEMPERATURE: 99 F | HEART RATE: 70 BPM | WEIGHT: 159.81 LBS | OXYGEN SATURATION: 98 % | DIASTOLIC BLOOD PRESSURE: 93 MMHG | RESPIRATION RATE: 18 BRPM | BODY MASS INDEX: 24.3 KG/M2 | SYSTOLIC BLOOD PRESSURE: 156 MMHG

## 2024-04-04 DIAGNOSIS — Z12.5 SCREENING FOR PROSTATE CANCER: ICD-10-CM

## 2024-04-04 DIAGNOSIS — J43.2 CENTRILOBULAR EMPHYSEMA: Chronic | ICD-10-CM

## 2024-04-04 DIAGNOSIS — Z87.891 HISTORY OF SMOKING 25-50 PACK YEARS: Chronic | ICD-10-CM

## 2024-04-04 DIAGNOSIS — Z00.00 WELLNESS EXAMINATION: Primary | ICD-10-CM

## 2024-04-04 DIAGNOSIS — R07.2 PRECORDIAL PAIN: ICD-10-CM

## 2024-04-04 DIAGNOSIS — R03.0 ELEVATED BLOOD PRESSURE READING WITHOUT DIAGNOSIS OF HYPERTENSION: Chronic | ICD-10-CM

## 2024-04-04 DIAGNOSIS — J45.20 MILD INTERMITTENT EXTRINSIC ASTHMA WITHOUT COMPLICATION: Chronic | ICD-10-CM

## 2024-04-04 PROBLEM — J45.909 EXTRINSIC ASTHMA WITHOUT COMPLICATION: Chronic | Status: ACTIVE | Noted: 2024-04-04

## 2024-04-04 PROBLEM — F17.210 SMOKING GREATER THAN 40 PACK YEARS: Chronic | Status: RESOLVED | Noted: 2023-12-20 | Resolved: 2024-04-04

## 2024-04-04 PROCEDURE — 99396 PREV VISIT EST AGE 40-64: CPT | Mod: ,,, | Performed by: GENERAL PRACTICE

## 2024-04-04 RX ORDER — ALBUTEROL SULFATE 90 UG/1
AEROSOL, METERED RESPIRATORY (INHALATION)
Qty: 18 G | Refills: 3 | Status: SHIPPED | OUTPATIENT
Start: 2024-04-04

## 2024-04-04 RX ORDER — FLUTICASONE PROPIONATE AND SALMETEROL 250; 50 UG/1; UG/1
1 POWDER RESPIRATORY (INHALATION) 2 TIMES DAILY
Qty: 60 EACH | Refills: 11 | Status: SHIPPED | OUTPATIENT
Start: 2024-04-04 | End: 2025-04-04

## 2024-09-25 ENCOUNTER — TELEPHONE (OUTPATIENT)
Dept: PRIMARY CARE CLINIC | Facility: CLINIC | Age: 59
End: 2024-09-25
Payer: COMMERCIAL

## 2024-09-25 PROBLEM — J44.9 COPD (CHRONIC OBSTRUCTIVE PULMONARY DISEASE): Status: ACTIVE | Noted: 2024-05-28

## 2024-09-25 PROBLEM — J45.909 ASTHMA: Status: ACTIVE | Noted: 2024-05-28

## 2024-09-25 NOTE — TELEPHONE ENCOUNTER
Pt scheduled for an appt and told he needs to be seen first before any medication can be called in

## 2024-09-25 NOTE — TELEPHONE ENCOUNTER
----- Message from Najma Mace sent at 9/25/2024  9:06 AM CDT -----  Regarding: health update  .Type:  Needs Medical Advice    Who Called: pt  Symptoms (please be specific):    How long has patient had these symptoms:    Pharmacy name and phone #:    Would the patient rather a call back or a response via [facility]?   Best Call Back Number: [phone]  Additional Information: request call

## 2024-09-25 NOTE — TELEPHONE ENCOUNTER
----- Message from Najma Mace sent at 9/25/2024  1:23 PM CDT -----  Regarding: med  .Type:  Needs Medical Advice    Who Called: pt  Symptoms (please be specific):    How long has patient had these symptoms:    Pharmacy name and phone #:    Would the patient rather a call back or a response via [facility]?   Best Call Back Number:  [phone]  Additional Information: request med call in of antibiotic

## 2024-09-25 NOTE — TELEPHONE ENCOUNTER
Pt advised to go back to the ER or [facility] for treatment of his right testicle. Pt states his right testicle is inflamed and he went to ER before for this issue and they gave him antibiotics. PT was trying to get in with PCP for treatment. Advised pt that  does not have any openings that he will have to go to the urgent care or ER for treatment.

## 2024-09-26 ENCOUNTER — OFFICE VISIT (OUTPATIENT)
Dept: PRIMARY CARE CLINIC | Facility: CLINIC | Age: 59
End: 2024-09-26
Payer: COMMERCIAL

## 2024-09-26 VITALS
HEIGHT: 68 IN | OXYGEN SATURATION: 99 % | WEIGHT: 159 LBS | RESPIRATION RATE: 18 BRPM | BODY MASS INDEX: 24.1 KG/M2 | HEART RATE: 85 BPM

## 2024-09-26 DIAGNOSIS — N45.2 ORCHITIS OF RIGHT TESTICLE: Primary | Chronic | ICD-10-CM

## 2024-09-26 RX ORDER — CEFTRIAXONE 1 G/1
1 INJECTION, POWDER, FOR SOLUTION INTRAMUSCULAR; INTRAVENOUS
Status: COMPLETED | OUTPATIENT
Start: 2024-09-26 | End: 2024-09-26

## 2024-09-26 RX ORDER — LEVOFLOXACIN 500 MG/1
500 TABLET, FILM COATED ORAL DAILY
Qty: 10 TABLET | Refills: 0 | Status: SHIPPED | OUTPATIENT
Start: 2024-09-26 | End: 2024-10-06

## 2024-09-26 RX ADMIN — CEFTRIAXONE 1 G: 1 INJECTION, POWDER, FOR SOLUTION INTRAMUSCULAR; INTRAVENOUS at 04:09

## 2024-09-26 NOTE — PROGRESS NOTES
"Subjective:       Patient ID: Ahsan Loredo Jr is a 59 y.o. male.    Chief Complaint: Testicle Pain    Established patient, presents to the clinic to discuss testicular pain and swelling.  This started a little over 24 hours ago.  He has had the same issue, had a full workup including an ultrasound, diagnosed with what sounds like a testicular infection, treated with antibiotics with complete resolution although he remained somewhat sensitive in this area.  No penile discharge, no dysuria, only mild urinary frequency.  Reports no fever, no abdominal pain, no significant weakness.      Review of Systems   Constitutional: Negative.  Negative for fatigue and fever.   HENT: Negative.  Negative for sore throat and trouble swallowing.    Eyes: Negative.  Negative for redness and visual disturbance.   Respiratory: Negative.  Negative for chest tightness and shortness of breath.    Cardiovascular: Negative.  Negative for chest pain.   Gastrointestinal: Negative.  Negative for abdominal pain, blood in stool and nausea.   Endocrine: Negative.  Negative for cold intolerance, heat intolerance and polyuria.   Genitourinary:  Positive for scrotal swelling and testicular pain.   Musculoskeletal: Negative.  Negative for arthralgias, gait problem and joint swelling.   Integumentary:  Negative for rash. Negative.   Neurological: Negative.  Negative for dizziness, weakness and headaches.   Psychiatric/Behavioral: Negative.  Negative for agitation and dysphoric mood.            Patient Care Team:  Duglas Bloom MD as PCP - General (Family Medicine)  Duglas Bloom MD as Consulting Physician (Family Medicine)  Objective:     Vitals:    09/26/24 1531   Pulse: 85   Resp: 18   SpO2: 99%   Weight: 72.1 kg (159 lb)   Height: 5' 8" (1.727 m)   Body mass index is 24.18 kg/m².     Physical Exam  Constitutional:       Appearance: Normal appearance.   Eyes:      Conjunctiva/sclera: Conjunctivae normal.   Cardiovascular:      Rate and Rhythm: " Normal rate and regular rhythm.   Pulmonary:      Effort: Pulmonary effort is normal.      Breath sounds: Normal breath sounds.   Genitourinary:     Comments: Tender moderately swollen right testicle, no sign of herniation.  Skin:     General: Skin is warm and dry.   Neurological:      Mental Status: He is alert.   Psychiatric:         Mood and Affect: Mood normal.         Behavior: Behavior normal.           Assessment:       ICD-10-CM ICD-9-CM   1. Orchitis of right testicle  N45.2 604.90       Current Outpatient Medications   Medication Instructions    albuterol (PROVENTIL/VENTOLIN HFA) 90 mcg/actuation inhaler inhale 1 PUFF BY MOUTH FOUR TIMES DAILY AS NEEDED FOR wheezing    fexofenadine (ALLERGY RELIEF (FEXOFENADINE)) 180 mg, Oral, Daily    fluticasone propionate (FLONASE) 100 mcg, Each Nostril, 2 times daily    fluticasone-salmeterol diskus inhaler 250-50 mcg 1 puff, Inhalation, 2 times daily, Controller    levoFLOXacin (LEVAQUIN) 500 mg, Oral, Daily    montelukast (SINGULAIR) 10 mg, Oral, Daily     Plan:     Problem List Items Addressed This Visit          Renal/    Orchitis of right testicle - Primary    Overview     Clinical presentation suggest orchitis.  Not able to completely rule out testicular torsion, ultrasound of the scrotum/testicular area will be ordered to be done after this visit.  Results will be monitored, he will receive IM Rocephin and levofloxacin 700 mg daily for 10 days.         Relevant Medications    levoFLOXacin (LEVAQUIN) 500 MG tablet    cefTRIAXone injection 1 g (Completed)    Other Relevant Orders    US Scrotum And Testicles               Follow up for next appointment as scheduled or as needed.    This note was created with the assistance of [employer] voice recognition software or phone dictation. There may be transcription errors as a result of using this technology. Effort has been made to assure accuracy of transcription but any obvious errors or omissions should be clarified  with the author of the document.

## 2024-10-13 PROBLEM — J44.9 COPD (CHRONIC OBSTRUCTIVE PULMONARY DISEASE): Chronic | Status: ACTIVE | Noted: 2024-05-28

## 2024-10-14 ENCOUNTER — OFFICE VISIT (OUTPATIENT)
Dept: PRIMARY CARE CLINIC | Facility: CLINIC | Age: 59
End: 2024-10-14
Payer: COMMERCIAL

## 2024-10-14 VITALS
DIASTOLIC BLOOD PRESSURE: 83 MMHG | SYSTOLIC BLOOD PRESSURE: 138 MMHG | OXYGEN SATURATION: 99 % | HEART RATE: 90 BPM | BODY MASS INDEX: 24.18 KG/M2 | HEIGHT: 68 IN | RESPIRATION RATE: 18 BRPM

## 2024-10-14 DIAGNOSIS — J44.9 CHRONIC OBSTRUCTIVE PULMONARY DISEASE, UNSPECIFIED COPD TYPE: ICD-10-CM

## 2024-10-14 DIAGNOSIS — Z12.5 SCREENING FOR PROSTATE CANCER: ICD-10-CM

## 2024-10-14 DIAGNOSIS — R03.0 ELEVATED BLOOD PRESSURE READING WITHOUT DIAGNOSIS OF HYPERTENSION: Primary | Chronic | ICD-10-CM

## 2024-10-14 DIAGNOSIS — J43.2 CENTRILOBULAR EMPHYSEMA: Chronic | ICD-10-CM

## 2024-10-14 DIAGNOSIS — Z00.00 WELLNESS EXAMINATION: ICD-10-CM

## 2024-10-14 DIAGNOSIS — Z82.49 FAMILY HISTORY OF EARLY CAD: Chronic | ICD-10-CM

## 2024-10-14 DIAGNOSIS — Z87.891 HISTORY OF SMOKING 25-50 PACK YEARS: Chronic | ICD-10-CM

## 2024-10-14 DIAGNOSIS — J45.20 MILD INTERMITTENT EXTRINSIC ASTHMA WITHOUT COMPLICATION: Chronic | ICD-10-CM

## 2024-10-14 DIAGNOSIS — K50.10 CROHN'S DISEASE OF LARGE INTESTINE WITHOUT COMPLICATION: Chronic | ICD-10-CM

## 2024-10-14 DIAGNOSIS — J30.89 NON-SEASONAL ALLERGIC RHINITIS DUE TO FUNGAL SPORES: Chronic | ICD-10-CM

## 2024-10-14 PROBLEM — R07.2 PRECORDIAL PAIN: Status: RESOLVED | Noted: 2024-04-04 | Resolved: 2024-10-14

## 2024-10-14 PROBLEM — J30.9 ALLERGIC RHINITIS, UNSPECIFIED: Chronic | Status: ACTIVE | Noted: 2024-10-14

## 2024-10-14 PROBLEM — J30.9 ALLERGIC RHINITIS, UNSPECIFIED: Status: ACTIVE | Noted: 2024-10-14

## 2024-10-14 PROCEDURE — 99214 OFFICE O/P EST MOD 30 MIN: CPT | Mod: ,,, | Performed by: GENERAL PRACTICE

## 2024-10-14 RX ORDER — ALBUTEROL SULFATE 90 UG/1
INHALANT RESPIRATORY (INHALATION)
Qty: 18 G | Refills: 3 | Status: SHIPPED | OUTPATIENT
Start: 2024-10-14 | End: 2024-10-14 | Stop reason: SDUPTHER

## 2024-10-14 RX ORDER — ALBUTEROL SULFATE 90 UG/1
INHALANT RESPIRATORY (INHALATION)
Qty: 18 G | Refills: 11 | Status: SHIPPED | OUTPATIENT
Start: 2024-10-14

## 2024-10-14 RX ORDER — FLUTICASONE PROPIONATE 50 MCG
2 SPRAY, SUSPENSION (ML) NASAL 2 TIMES DAILY
Qty: 16 G | Refills: 3 | Status: SHIPPED | OUTPATIENT
Start: 2024-10-14

## 2024-10-14 RX ORDER — FLUTICASONE PROPIONATE 50 MCG
2 SPRAY, SUSPENSION (ML) NASAL 2 TIMES DAILY
Qty: 16 G | Refills: 3 | Status: SHIPPED | OUTPATIENT
Start: 2024-10-14 | End: 2024-10-14

## 2024-10-14 NOTE — PROGRESS NOTES
"Subjective:       Patient ID: Ahsan Loredo Jr is a 59 y.o. male.    Chief Complaint: No chief complaint on file.    Patient presents to the clinic today for chronic condition follow-up.  Doing well, no specific complaints, tolerating all medications, reporting no significant side effects or problems.    Last wellness exam was 04/04/2024.    Chronic conditions being treated include but are not limited to emphysema/COPD.      Review of Systems   Constitutional: Negative.  Negative for fatigue and fever.   HENT: Negative.  Negative for sore throat and trouble swallowing.    Eyes: Negative.  Negative for redness and visual disturbance.   Respiratory: Negative.  Negative for chest tightness and shortness of breath.    Cardiovascular: Negative.  Negative for chest pain.   Gastrointestinal: Negative.  Negative for abdominal pain, blood in stool and nausea.   Endocrine: Negative.  Negative for cold intolerance, heat intolerance and polyuria.   Genitourinary: Negative.    Musculoskeletal: Negative.  Negative for arthralgias, gait problem and joint swelling.   Integumentary:  Negative for rash. Negative.   Neurological: Negative.  Negative for dizziness, weakness and headaches.   Psychiatric/Behavioral: Negative.  Negative for agitation and dysphoric mood.            Patient Care Team:  Duglas Bloom MD as PCP - General (Family Medicine)  Duglas Bloom MD as Consulting Physician (Family Medicine)  Objective:   Visit Vitals  /83   Pulse 90   Resp 18   Ht 5' 8" (1.727 m)   SpO2 99%   BMI 24.18 kg/m²        Physical Exam  Constitutional:       Appearance: Normal appearance.   HENT:      Head: Normocephalic.      Mouth/Throat:      Mouth: Mucous membranes are moist.      Pharynx: Oropharynx is clear.   Eyes:      Conjunctiva/sclera: Conjunctivae normal.      Pupils: Pupils are equal, round, and reactive to light.   Cardiovascular:      Rate and Rhythm: Normal rate and regular rhythm.      Heart sounds: Normal heart " sounds.   Pulmonary:      Effort: Pulmonary effort is normal.      Breath sounds: Normal breath sounds.   Abdominal:      General: Abdomen is flat.      Palpations: Abdomen is soft.   Musculoskeletal:         General: Normal range of motion.      Cervical back: Neck supple.   Skin:     General: Skin is warm and dry.   Neurological:      General: No focal deficit present.      Mental Status: He is alert and oriented to person, place, and time.   Psychiatric:         Mood and Affect: Mood normal.         Behavior: Behavior normal.         Thought Content: Thought content normal.         Judgment: Judgment normal.           Lab Results   Component Value Date     03/21/2024    K 4.6 03/21/2024     03/21/2024    CO2 29 03/21/2024    BUN 10.0 03/21/2024    CREATININE 0.88 03/21/2024    CALCIUM 9.7 03/21/2024    ALBUMIN 4.3 03/21/2024    BILITOT 1.5 03/21/2024    ALKPHOS 95 03/21/2024    AST 18 03/21/2024    ALT 12 03/21/2024    EGFRNORACEVR >60 03/21/2024     Lab Results   Component Value Date    WBC 6.90 03/21/2024    RBC 4.96 03/21/2024    HGB 15.1 03/21/2024    HCT 46.8 03/21/2024    MCV 94.4 (H) 03/21/2024    RDW 13.2 03/21/2024     03/21/2024      Lab Results   Component Value Date    CHOL 180 03/21/2024    TRIG 93 03/21/2024    HDL 56 03/21/2024    .00 03/21/2024    TOTALCHOLEST 3 03/21/2024     Lab Results   Component Value Date    TSH 1.908 03/21/2024     Lab Results   Component Value Date    HGBA1C 5.2 03/21/2024        Lab Results   Component Value Date    PSA 1.62 03/21/2024         Assessment:       ICD-10-CM ICD-9-CM   1. Elevated blood pressure reading without diagnosis of hypertension  R03.0 796.2   2. Family history of early CAD  Z82.49 V17.3   3. Centrilobular emphysema  J43.2 492.8   4. Chronic obstructive pulmonary disease, unspecified COPD type  J44.9 496   5. Mild intermittent extrinsic asthma without complication  J45.20 493.00   6. Non-seasonal allergic rhinitis due to  fungal spores  J30.89 477.8   7. Crohn's disease of large intestine without complication  K50.10 555.1   8. History of smoking 25-50 pack years  Z87.891 V15.82   9. Wellness examination  Z00.00 V70.0   10. Screening for prostate cancer  Z12.5 V76.44       Current Outpatient Medications   Medication Instructions    albuterol (PROVENTIL/VENTOLIN HFA) 90 mcg/actuation inhaler inhale 2-4 PUFFS BY MOUTH SIX TIMES DAILY AS NEEDED FOR wheezing    fexofenadine (ALLERGY RELIEF (FEXOFENADINE)) 180 mg, Oral, Daily    fluticasone propionate (FLONASE) 100 mcg, Each Nostril, 2 times daily    fluticasone-salmeterol diskus inhaler 250-50 mcg 1 puff, Inhalation, 2 times daily, Controller    montelukast (SINGULAIR) 10 mg, Oral, Daily     Plan:     Problem List Items Addressed This Visit          ENT    Non-seasonal allergic rhinitis due to fungal spores (Chronic)    Overview     Prescribed Flonase, two sprays each nostril daily.    This medical condition is currently stable on prescription medication, continue current treatment plan.         Relevant Medications    fluticasone propionate (FLONASE) 50 mcg/actuation nasal spray       Pulmonary    Centrilobular emphysema (Chronic)    Overview     Prescribed albuterol inhaler and Advair 250/50.  From CT of chest August 2021:      FINDINGS:     Lung nodules: There are a few scattered punctate bilateral pulmonary  nodules, largest measuring up to 3 mm for example in the right lower  lobe (series 602, image 61).      Lung parenchyma: Mild emphysematous changes.    Prescribed albuterol inhaler, uses as needed.         Relevant Medications    albuterol (PROVENTIL/VENTOLIN HFA) 90 mcg/actuation inhaler    Extrinsic asthma without complication (Chronic)    Overview     Prescribed albuterol MDI and Advair 250/50.         Relevant Medications    albuterol (PROVENTIL/VENTOLIN HFA) 90 mcg/actuation inhaler    COPD (chronic obstructive pulmonary disease) (Chronic)    Overview     Patient has been  diagnosed with emphysema either by pulmonary function testing or imaging/CT scanning.  While there many treatments for emphysema, the most important issue is the discontinuation of smoking, if the patient has not already done so.  Certainly, continued tobacco use will continue to damage pulmonary function in a way that will ultimately worsen chronic lung disease.  Symptoms such as dyspnea will be monitored closely, and treated appropriately with medication if it occurs.            Cardiac/Vascular    Family history of early CAD (Chronic)    Overview     Family members with known history of coronary artery disease:    Cardiovascular risk factors that have been addressed and are being actively monitor and/or treated are as follows:           RESOLVED: Elevated blood pressure reading without diagnosis of hypertension - Primary (Chronic)    Overview     Checking blood pressures at home occasionally, readings range around 130-135/80-85.  Continue blood pressure monitoring at home on occasion, looking for numbers at or below 140/90.            GI    Crohn's disease of large intestine without complication (Chronic)    Overview     Medical condition is currently stable, continue current treatment plan.            Other    History of smoking 25-50 pack years (Chronic)    Overview     Quit smoking December 2023.          Other Visit Diagnoses       Wellness examination        This diagnosis does not apply to this visit, wellness exam with pre visit labs will be scheduled/ordered for future visit.    Relevant Orders    Comprehensive Metabolic Panel    CBC Auto Differential    Lipid Panel    Hemoglobin A1C    TSH    Screening for prostate cancer        This diagnosis does not apply to this visit, appropriate prostate cancer screening will be ordered for next visit/wellness exam.    Relevant Orders    PSA, Screening                    Follow up in about 6 months (around 4/16/2025) for Wellness.    This note was created with the  assistance of [employer] voice recognition software or phone dictation. There may be transcription errors as a result of using this technology. Effort has been made to assure accuracy of transcription but any obvious errors or omissions should be clarified with the author of the document.

## 2025-01-02 ENCOUNTER — TELEPHONE (OUTPATIENT)
Dept: PRIMARY CARE CLINIC | Facility: CLINIC | Age: 60
End: 2025-01-02
Payer: COMMERCIAL

## 2025-01-02 NOTE — TELEPHONE ENCOUNTER
Pt called and stated he is having difficulty breathing due to exposure of mold. Pt states he's been having symptoms for x5 days. Pt wanted appt to see doctor, I explained to pt Dr Bloom is out of office. Advised pt to seek ER, pt verbalized understanding. Pt stated he will call for f/u appt if needed after visit to ER.

## 2025-01-06 ENCOUNTER — TELEPHONE (OUTPATIENT)
Dept: PRIMARY CARE CLINIC | Facility: CLINIC | Age: 60
End: 2025-01-06
Payer: COMMERCIAL

## 2025-01-06 NOTE — TELEPHONE ENCOUNTER
----- Message from Nurse Sia sent at 1/3/2025 11:14 AM CST -----  Regarding: FW: call back  Please contact to schedule a urgent care follow up. Thanks  ----- Message -----  From: Maggie Sharp LPN  Sent: 1/2/2025   7:45 AM CST  To: Sia Montes LPN  Subject: FW: call back                                      ----- Message -----  From: Elly Gandhi  Sent: 1/2/2025   7:35 AM CST  To: [facility] Staff  Subject: call back                                        .Who Called: Ahsan Loredo Jr    Caller is requesting assistance/information from provider's office.    Symptoms (please be specific): exposed to mold   How long has patient had these symptoms:    List of preferred pharmacies on file (remove unneeded): [unfilled]  If different, enter pharmacy into here including location and phone number:       Preferred Method of Contact: Phone Call  Patient's Preferred Phone Number on File: [phone]   Best Call Back Number, if different:  Additional Information: pt requesting a call back